# Patient Record
Sex: MALE | Race: WHITE | NOT HISPANIC OR LATINO | Employment: FULL TIME | URBAN - METROPOLITAN AREA
[De-identification: names, ages, dates, MRNs, and addresses within clinical notes are randomized per-mention and may not be internally consistent; named-entity substitution may affect disease eponyms.]

---

## 2022-04-21 ENCOUNTER — HOSPITAL ENCOUNTER (EMERGENCY)
Facility: HOSPITAL | Age: 22
Discharge: HOME | End: 2022-04-21
Attending: EMERGENCY MEDICINE
Payer: COMMERCIAL

## 2022-04-21 VITALS
SYSTOLIC BLOOD PRESSURE: 159 MMHG | HEART RATE: 90 BPM | WEIGHT: 283.8 LBS | TEMPERATURE: 99 F | OXYGEN SATURATION: 97 % | HEIGHT: 72 IN | DIASTOLIC BLOOD PRESSURE: 83 MMHG | BODY MASS INDEX: 38.44 KG/M2 | RESPIRATION RATE: 18 BRPM

## 2022-04-21 DIAGNOSIS — R04.0 EPISTAXIS: Primary | ICD-10-CM

## 2022-04-21 PROCEDURE — 99282 EMERGENCY DEPT VISIT SF MDM: CPT | Performed by: EMERGENCY MEDICINE

## 2022-04-21 PROCEDURE — 99283 EMERGENCY DEPT VISIT LOW MDM: CPT

## 2022-04-21 RX ORDER — GINSENG 100 MG
1 CAPSULE ORAL ONCE
Status: COMPLETED | OUTPATIENT
Start: 2022-04-21 | End: 2022-04-21

## 2022-04-21 RX ADMIN — PHENYLEPHRINE HYDROCHLORIDE 2 SPRAY: 0.25 SPRAY NASAL at 15:22

## 2022-04-21 RX ADMIN — BACITRACIN 1 SMALL APPLICATION: 500 OINTMENT TOPICAL at 15:37

## 2022-04-21 NOTE — DISCHARGE INSTRUCTIONS
Return to the ER for further concerns or worsening symptoms  Follow up with your primary care physician and ENT in 1-2 days

## 2022-04-21 NOTE — ED PROVIDER NOTES
History  Chief Complaint   Patient presents with   [address] [facility] has had 3 nosebleeds left nostril in past 2 days  States last nose bleed today lasted 30 minutes and he feels weak  Skin is pink, warm and dry  No bleeding at present  Patient here with complaint of intermittent episodes of epistaxis x2 days  Patient states he has a remote history of same as a child  Symptoms began after he blew his nose yesterday  He denies blood thinner use  Patient is asymptomatic arrival to the ER  During my initial evaluation, patient developed severe epistaxis which required packing  History provided by:  Patient   used: No    Nose Bleed  Location:  L nare  Severity:  Moderate  Timing:  Intermittent  Progression:  Waxing and waning  Chronicity:  New  Relieved by:  Applying pressure  Worsened by:  Nothing  Associated symptoms: no cough and no fever        None       History reviewed  No pertinent past medical history  Past Surgical History:   Procedure Laterality Date    TESTICLE SURGERY      for undescended testicle        History reviewed  No pertinent family history  I have reviewed and agree with the history as documented  E-Cigarette/Vaping    E-Cigarette Use Former User      E-Cigarette/Vaping Substances    Nicotine No     THC No     CBD No     Flavoring Yes     Other No     Unknown No      Social History     Tobacco Use    Smoking status: Never Smoker    Smokeless tobacco: Never Used   Vaping Use    Vaping Use: Former    Substances: Flavoring   Substance Use Topics    Alcohol use: Yes     Comment: rarely    Drug use: Never       Review of Systems   Constitutional: Negative for chills and fever  HENT: Positive for nosebleeds  Respiratory: Negative for cough, shortness of breath and wheezing  Cardiovascular: Negative for chest pain and palpitations  Gastrointestinal: Negative for abdominal pain, constipation, diarrhea, nausea and vomiting  Genitourinary: Negative for dysuria, flank pain, hematuria and urgency  Musculoskeletal: Negative for back pain  Skin: Negative for color change and rash  All other systems reviewed and are negative  Physical Exam  Physical Exam  Vitals and nursing note reviewed  Constitutional:       Appearance: He is well-developed  HENT:      Head: Normocephalic and atraumatic  Nose:      Right Nostril: No epistaxis  Left Nostril: Epistaxis present  No septal hematoma or occlusion  Eyes:      Extraocular Movements: Extraocular movements intact  Pupils: Pupils are equal, round, and reactive to light  Cardiovascular:      Rate and Rhythm: Normal rate and regular rhythm  Heart sounds: Normal heart sounds  Pulmonary:      Effort: Pulmonary effort is normal       Breath sounds: Normal breath sounds  Abdominal:      General: Bowel sounds are normal  There is no distension  Palpations: Abdomen is soft  There is no mass  Tenderness: There is no abdominal tenderness  There is no guarding or rebound  Musculoskeletal:      Cervical back: Normal range of motion and neck supple  Skin:     General: Skin is warm and dry  Capillary Refill: Capillary refill takes less than 2 seconds  Neurological:      General: No focal deficit present  Mental Status: He is alert and oriented to person, place, and time  Psychiatric:         Behavior: Behavior normal          Thought Content:  Thought content normal          Judgment: Judgment normal          Vital Signs  ED Triage Vitals [04/21/22 1348]   Temperature Pulse Respirations Blood Pressure SpO2   99 °F (37 2 °C) 99 18 138/82 97 %      Temp Source Heart Rate Source Patient Position - Orthostatic VS BP Location FiO2 (%)   Tympanic Monitor Sitting Left arm --      Pain Score       No Pain           Vitals:    04/21/22 1348 04/21/22 1723   BP: 138/82 159/83   Pulse: 99 90   Patient Position - Orthostatic VS: Sitting          Visual Acuity      ED Medications  Medications   phenylephrine (SOPHIE-SYNEPHRINE) 0 25 % nasal spray 2 spray (2 sprays Each Nare Given 4/21/22 1522)   bacitracin topical ointment 1 small application (1 small application Topical Given 4/21/22 1537)       Diagnostic Studies  Results Reviewed     None                 No orders to display              Procedures  Procedures         ED Course  ED Course as of 04/25/22 1851   Thu Apr 21, 2022   1629 I placed a 4 5 cm nasal tampon  Patient tolerated with difficulty and refuse inflation of the balloon  Patient requests that nasal packing be discontinued  I offered other alternatives  Patient states that he would like to do his research while in the ER before agreeing to any further treatment  SBIRT 22yo+      Most Recent Value   SBIRT (22 yo +)    In order to provide better care to our patients, we are screening all of our patients for alcohol and drug use  Would it be okay to ask you these screening questions? Yes Filed at: 04/21/2022 1506   Initial Alcohol Screen: US AUDIT-C     1  How often do you have a drink containing alcohol? 1 Filed at: 04/21/2022 1506   2  How many drinks containing alcohol do you have on a typical day you are drinking? 0 Filed at: 04/21/2022 1506   3a  Male UNDER 65: How often do you have five or more drinks on one occasion? 0 Filed at: 04/21/2022 1506   3b  FEMALE Any Age, or MALE 65+: How often do you have 4 or more drinks on one occassion? 0 Filed at: 04/21/2022 1506   Audit-C Score 1 Filed at: 04/21/2022 1506   TIMOTHY: How many times in the past year have you    Used an illegal drug or used a prescription medication for non-medical reasons? Never Filed at: 04/21/2022 1506                    MDM  Number of Diagnoses or Management Options  Epistaxis: new and requires workup  Diagnosis management comments: Nasal tampon please as treatment for epistaxis, however patient was unable to tolerated    Packing removed as patient's request   Patient refuses further treatment  Will be discharged to follow-up with ENT  Patient agrees with plan  Epistaxis resolved at the time of discharge  Risk of Complications, Morbidity, and/or Mortality  Presenting problems: high  Diagnostic procedures: high  Management options: high    Patient Progress  Patient progress: improved      Disposition  Final diagnoses:   Epistaxis     Time reflects when diagnosis was documented in both MDM as applicable and the Disposition within this note     Time User Action Codes Description Comment    4/21/2022  5:11 PM Nuria Elder Add [R04 0] Epistaxis       ED Disposition     ED Disposition Condition Date/Time Comment    Discharge Stable Thu Apr 21, 2022  5:11 PM Anola Duck discharge to home/self care  Follow-up Information     Follow up With Specialties Details Why Contact Info Additional Information    [facility] ENT [facility] Otolaryngology Schedule an appointment as soon as possible for a visit in 1 day for follow up [address] [place] [id number]  [address] [facility], [address] [place] [address], [id number], [phone]          There are no discharge medications for this patient  No discharge procedures on file      PDMP Review     None          ED Provider  Electronically Signed by           Vinay Price DO  04/25/22 4632

## 2025-05-30 ENCOUNTER — APPOINTMENT (OUTPATIENT)
Dept: URGENT CARE | Age: 25
End: 2025-05-30
Payer: OTHER MISCELLANEOUS

## 2025-05-30 PROCEDURE — G0383 LEV 4 HOSP TYPE B ED VISIT: HCPCS | Performed by: PHYSICIAN ASSISTANT

## 2025-05-30 PROCEDURE — 99284 EMERGENCY DEPT VISIT MOD MDM: CPT | Performed by: PHYSICIAN ASSISTANT

## 2025-06-05 ENCOUNTER — APPOINTMENT (OUTPATIENT)
Age: 25
End: 2025-06-05
Payer: OTHER MISCELLANEOUS

## 2025-06-05 PROCEDURE — 99214 OFFICE O/P EST MOD 30 MIN: CPT | Performed by: PHYSICIAN ASSISTANT

## 2025-06-13 ENCOUNTER — APPOINTMENT (OUTPATIENT)
Age: 25
End: 2025-06-13
Payer: OTHER MISCELLANEOUS

## 2025-06-13 PROCEDURE — 99213 OFFICE O/P EST LOW 20 MIN: CPT | Performed by: PHYSICIAN ASSISTANT

## 2025-07-02 ENCOUNTER — APPOINTMENT (OUTPATIENT)
Dept: URGENT CARE | Age: 25
End: 2025-07-02
Payer: OTHER MISCELLANEOUS

## 2025-07-02 PROCEDURE — 99213 OFFICE O/P EST LOW 20 MIN: CPT | Performed by: PHYSICIAN ASSISTANT

## 2025-07-17 ENCOUNTER — APPOINTMENT (OUTPATIENT)
Dept: URGENT CARE | Age: 25
End: 2025-07-17
Payer: OTHER MISCELLANEOUS

## 2025-07-17 PROCEDURE — 99214 OFFICE O/P EST MOD 30 MIN: CPT | Performed by: PHYSICIAN ASSISTANT

## 2025-07-30 ENCOUNTER — APPOINTMENT (OUTPATIENT)
Dept: URGENT CARE | Age: 25
End: 2025-07-30
Payer: OTHER MISCELLANEOUS

## 2025-07-30 PROCEDURE — 99213 OFFICE O/P EST LOW 20 MIN: CPT

## 2025-08-05 ENCOUNTER — TELEPHONE (OUTPATIENT)
Age: 25
End: 2025-08-05

## 2025-08-14 ENCOUNTER — APPOINTMENT (OUTPATIENT)
Dept: URGENT CARE | Age: 25
End: 2025-08-14
Payer: OTHER MISCELLANEOUS

## 2025-08-19 ENCOUNTER — CONSULT (OUTPATIENT)
Dept: PAIN MEDICINE | Facility: CLINIC | Age: 25
End: 2025-08-19

## 2025-08-19 VITALS — BODY MASS INDEX: 34.65 KG/M2 | TEMPERATURE: 98 F | HEART RATE: 99 BPM | WEIGHT: 270 LBS | HEIGHT: 74 IN

## 2025-08-19 DIAGNOSIS — M54.50 ACUTE MIDLINE LOW BACK PAIN WITHOUT SCIATICA: ICD-10-CM

## 2025-08-19 DIAGNOSIS — M51.26 LUMBAR DISC HERNIATION: Primary | ICD-10-CM

## 2025-08-19 PROCEDURE — 99205 OFFICE O/P NEW HI 60 MIN: CPT | Performed by: ANESTHESIOLOGY

## 2025-08-19 RX ORDER — PREDNISONE 10 MG/1
TABLET ORAL
Qty: 36 TABLET | Refills: 0 | Status: SHIPPED | OUTPATIENT
Start: 2025-08-19